# Patient Record
Sex: FEMALE | Race: WHITE | Employment: OTHER | URBAN - METROPOLITAN AREA
[De-identification: names, ages, dates, MRNs, and addresses within clinical notes are randomized per-mention and may not be internally consistent; named-entity substitution may affect disease eponyms.]

---

## 2017-03-10 ENCOUNTER — OFFICE VISIT (OUTPATIENT)
Dept: SURGERY | Age: 52
End: 2017-03-10

## 2017-03-10 VITALS
HEART RATE: 70 BPM | OXYGEN SATURATION: 100 % | WEIGHT: 132 LBS | SYSTOLIC BLOOD PRESSURE: 94 MMHG | TEMPERATURE: 97.8 F | HEIGHT: 64 IN | DIASTOLIC BLOOD PRESSURE: 75 MMHG | BODY MASS INDEX: 22.53 KG/M2

## 2017-03-10 DIAGNOSIS — Z90.722 H/O TOTAL HYSTERECTOMY WITH REMOVAL OF BOTH TUBES AND OVARIES: ICD-10-CM

## 2017-03-10 DIAGNOSIS — M85.80 OSTEOPENIA: ICD-10-CM

## 2017-03-10 DIAGNOSIS — Z90.79 H/O TOTAL HYSTERECTOMY WITH REMOVAL OF BOTH TUBES AND OVARIES: ICD-10-CM

## 2017-03-10 DIAGNOSIS — Z01.419 ENCOUNTER FOR GYNECOLOGICAL EXAMINATION WITHOUT ABNORMAL FINDING: Primary | ICD-10-CM

## 2017-03-10 DIAGNOSIS — N95.1 SYMPTOMATIC MENOPAUSAL OR FEMALE CLIMACTERIC STATES: ICD-10-CM

## 2017-03-10 DIAGNOSIS — Z12.31 ENCOUNTER FOR SCREENING MAMMOGRAM FOR BREAST CANCER: ICD-10-CM

## 2017-03-10 DIAGNOSIS — Z90.710 H/O TOTAL HYSTERECTOMY WITH REMOVAL OF BOTH TUBES AND OVARIES: ICD-10-CM

## 2017-03-10 NOTE — MR AVS SNAPSHOT
Visit Information Date & Time Provider Department Dept. Phone Encounter #  
 3/10/2017  1:00 PM Dariela Woo, [address] [facility] [address] [id number] Follow-up Instructions Return in about 1 year (around 3/10/2018), or if symptoms worsen or fail to improve. Upcoming Health Maintenance Date Due Hepatitis C Screening 1965 DTaP/Tdap/Td series (1 - Tdap) 7/29/1984 FOBT Q 1 YEAR AGE 50-75 7/29/2013 INFLUENZA AGE 9 TO ADULT 8/1/2016 PAP AKA CERVICAL CYTOLOGY 12/19/2017 BREAST CANCER SCRN MAMMOGRAM 3/11/2018 Allergies as of 3/10/2017  Review Complete On: 3/10/2017 By: Dariela Woo MD  
  
 Severity Noted Reaction Type Reactions Compazine [Prochlorperazine Edisylate]  10/26/2011    Unable to Obtain Tedral  12/16/2011    Unable to Obtain Current Immunizations  Never Reviewed No immunizations on file. Not reviewed this visit You Were Diagnosed With   
  
 Codes Comments Encounter for gynecological examination without abnormal finding    -  Primary ICD-10-CM: G33.062 ICD-9-CM: V72.31 Symptomatic menopausal or female climacteric states     ICD-10-CM: N95.1 ICD-9-CM: 627.2 H/O total hysterectomy with removal of both tubes and ovaries     ICD-10-CM: Z90.710, Z90.79, I23.132 ICD-9-CM: V88.01, V45.77 Osteopenia     ICD-10-CM: M85.80 ICD-9-CM: 733.90 Encounter for screening mammogram for breast cancer     ICD-10-CM: Z12.31 
ICD-9-CM: V76.12 Vitals BP Pulse Temp Height(growth percentile) Weight(growth percentile) LMP  
 94/75 70 97.8 °F (36.6 °C) (Oral) 5' 4\" (1.626 m) 132 lb (59.9 kg) 09/25/2000 SpO2 BMI OB Status Smoking Status 100% 22.66 kg/m2 Hysterectomy Never Smoker Vitals History BMI and BSA Data Body Mass Index Body Surface Area  
 22.66 kg/m 2 1.64 m 2 Preferred Pharmacy Pharmacy Name Phone  [facility], [address] [facility] [phone] Your Updated Medication List  
  
   
This list is accurate as of: 3/10/17  1:43 PM.  Always use your most recent med list.  
  
  
  
  
 estrogens (conjugated) 0.45 mg tablet Commonly known as:  PREMARIN  
TAKE 1 TABLET DAILY  
  
 SINGULAIR 10 mg tablet Generic drug:  montelukast  
Take 10 mg by mouth daily. Prescriptions Sent to Pharmacy Refills  
 estrogens, conjugated, (PREMARIN) 0.45 mg tablet 3 Sig: TAKE 1 TABLET DAILY Class: Normal  
 Pharmacy: [address] [place] [address] 43, [address]  #: [phone] Follow-up Instructions Return in about 1 year (around 3/10/2018), or if symptoms worsen or fail to improve. To-Do List   
 03/10/2017 Imaging:  DEXA BONE DENSITY STUDY AXIAL   
  
 03/10/2017 Imaging:  MARISA MAMMO BI SCREENING INCL CAD Introducing [facility]! Indiana Yadav introduces Asurint patient portal. Now you can access parts of your medical record, email your doctor's office, and request medication refills online. 1. In your internet browser, go to [email or web address] 2. Click on the First Time User? Click Here link in the Sign In box. You will see the New Member Sign Up page. 3. Enter your Asurint Access Code exactly as it appears below. You will not need to use this code after youve completed the sign-up process. If you do not sign up before the expiration date, you must request a new code. · Asurint Access Code: [email or web address] Expires: 6/8/2017  1:42 PM 
 
4. Enter the last four digits of your Social Security Number (xxxx) and Date of Birth (mm/dd/yyyy) as indicated and click Submit. You will be taken to the next sign-up page. 5. Create a Bone Therapeuticst ID. This will be your Asurint login ID and cannot be changed, so think of one that is secure and easy to remember. 6. Create a Asurint password. You can change your password at any time. 7. Enter your Password Reset Question and Answer. This can be used at a later time if you forget your password. 8. Enter your e-mail address. You will receive e-mail notification when new information is available in [address] 9. Click Sign Up. You can now view and download portions of your medical record. 10. Click the Download Summary menu link to download a portable copy of your medical information. If you have questions, please visit the Frequently Asked Questions section of the StockCastr website. Remember, StockCastr is NOT to be used for urgent needs. For medical emergencies, dial 911. Now available from your iPhone and Android! Please provide this summary of care documentation to your next provider. Your primary care clinician is listed as Jahaira Guerrier. If you have any questions after today's visit, please call [phone].

## 2017-03-10 NOTE — PROGRESS NOTES
SUBJECTIVE: Loreto An is a 46 y.o. female who presents with desire for annual well woman exam. Patient's last menstrual period was 2000. Menopausal symptoms. Hx of right rotator cuff injury due to bony spur and osteoarthritis. Hx of osteopenia. Allergies   Allergen Reactions    Compazine [Prochlorperazine Edisylate] Unable to Obtain    Tedral Unable to Obtain       Past Medical History:   Diagnosis Date    Arthritis     osteoarthritis in right shoulder with spur    Asthma        Past Surgical History:   Procedure Laterality Date    BIOPSY OF BREAST, NEEDLE CORE  1/15/08    HX APPENDECTOMY      HX BUNIONECTOMY  3/2013    HX MOHS PROCEDURES Right     HX RICHMOND AND BSO  2004    RICHMOND BSO 2004 endometriosis       OB History      Para Term  AB TAB SAB Ectopic Multiple Living    0 0 0 0 0 0 0 0 0 0          Family History   Problem Relation Age of Onset    Hypertension Mother     Arthritis-osteo Mother     Hypertension Father     Heart Disease Father      Social History     Social History    Marital status: SINGLE     Spouse name: N/A    Number of children: N/A    Years of education: N/A     Occupational History    Not on file. Social History Main Topics    Smoking status: Never Smoker    Smokeless tobacco: Never Used    Alcohol use Yes      Comment: socially    Drug use: No    Sexual activity: Yes     Partners: Female     Other Topics Concern    Not on file     Social History Narrative       Current Outpatient Prescriptions   Medication Sig Dispense Refill    estrogens, conjugated, (PREMARIN) 0.45 mg tablet TAKE 1 TABLET DAILY 90 Tab 3    montelukast (SINGULAIR) 10 mg tablet Take 10 mg by mouth daily. Review of Systems:   Constitutional: No weight change, chills or fever, anorexia, weakness or sleep disturbance . Cardiovascular: No chest pain, shortness of breath, or palpitations .   Respiratory: No cough, shortness of breath, hemoptysis, or orthopnea . Neurologic: No syncope, headaches or seizures . Hematologic: No easy bruising or unusual bleeding . Psychiatric: No insomnia, confusion, depression, or anxiety . GI:No nausea and vomiting, diarrhea or constipation  . : See HPI . Musculoskeletal: No joint pain or muscle pain . Endocrine: No polydipsia, polyuria, cold intolerance, excessive fatigue, or sleep disturbance . Integumentary: No breast pain, lumps, nipple discharge, or axillary lumps . Objective:     Visit Vitals    BP 94/75    Pulse 70    Temp 97.8 °F (36.6 °C) (Oral)    Ht 5' 4\" (1.626 m)    Wt 132 lb (59.9 kg)    LMP 09/25/2000    SpO2 100%    BMI 22.66 kg/m2       General:  alert, cooperative, no distress, appears stated age   Skin:  no rash or abnormalities   Eyes: negative   Mouth: MMM no lesions   Lymph Nodes:  Cervical, supraclavicular, and axillary nodes normal.   Breast Exam: normal appearance, no masses or tenderness    Lungs:  clear to auscultation bilaterally   Heart:  regular rate and rhythm   Abdomen: soft, non-tender. Bowel sounds normal. No masses,  no organomegaly   Back:  Costovertebral angle tenderness absent   Genitourinary: Pelvic exam: VULVA: normal appearing vulva with no masses, tenderness or lesions, VAGINA: normal appearing vagina with normal color and discharge, no lesions, PELVIC FLOOR EXAM: no cystocele, rectocele or prolapse noted, CERVIX: surgically absent, UTERUS: absent, ADNEXA: surgically absent bilateral    Extremities:  extremities normal, atraumatic, no cyanosis or edema   Neurologic:  sensation grossly intact. Psychiatric:  non focal     ASSESSMENT:      ICD-10-CM ICD-9-CM    1. Encounter for gynecological examination without abnormal finding Z01.419 V72.31    2. Symptomatic menopausal or female climacteric states N95.1 627.2 estrogens, conjugated, (PREMARIN) 0.45 mg tablet   3.  H/O total hysterectomy with removal of both tubes and ovaries Z90.710 V88.01     Z90.79 V45.77 Z90.722     4. Osteopenia M85.80 733.90 DEXA BONE DENSITY STUDY AXIAL   5. Encounter for screening mammogram for breast cancer Z12.31 V76.12 MARISA MAMMO BI SCREENING INCL CAD        Follow-up Disposition:  Return in about 1 year (around 3/10/2018), or if symptoms worsen or fail to improve.

## 2017-04-17 ENCOUNTER — HOSPITAL ENCOUNTER (OUTPATIENT)
Dept: MAMMOGRAPHY | Age: 52
Discharge: HOME | End: 2017-04-17
Attending: OBSTETRICS & GYNECOLOGY
Payer: COMMERCIAL

## 2017-04-17 DIAGNOSIS — M85.80 OSTEOPENIA: ICD-10-CM

## 2017-04-17 DIAGNOSIS — Z12.31 ENCOUNTER FOR SCREENING MAMMOGRAM FOR BREAST CANCER: ICD-10-CM

## 2017-04-17 PROCEDURE — 77067 SCR MAMMO BI INCL CAD: CPT

## 2017-04-17 PROCEDURE — 77080 DXA BONE DENSITY AXIAL: CPT

## 2018-02-26 DIAGNOSIS — N95.1 SYMPTOMATIC MENOPAUSAL OR FEMALE CLIMACTERIC STATES: ICD-10-CM

## 2018-02-26 RX ORDER — ESTROGENS, CONJUGATED 0.45 MG/1
TABLET, FILM COATED ORAL
Qty: 30 TAB | Refills: 0 | Status: SHIPPED | OUTPATIENT
Start: 2018-02-26

## 2018-03-13 ENCOUNTER — HOSPITAL ENCOUNTER (OUTPATIENT)
Dept: LAB | Age: 53
Discharge: HOME | End: 2018-03-13
Payer: COMMERCIAL

## 2018-03-13 ENCOUNTER — OFFICE VISIT (OUTPATIENT)
Dept: SURGERY | Age: 53
End: 2018-03-13

## 2018-03-13 VITALS
HEIGHT: 64 IN | BODY MASS INDEX: 23.42 KG/M2 | TEMPERATURE: 97.9 F | WEIGHT: 137.2 LBS | OXYGEN SATURATION: 100 % | HEART RATE: 70 BPM | SYSTOLIC BLOOD PRESSURE: 107 MMHG | DIASTOLIC BLOOD PRESSURE: 72 MMHG

## 2018-03-13 DIAGNOSIS — N95.1 SYMPTOMATIC MENOPAUSAL OR FEMALE CLIMACTERIC STATES: ICD-10-CM

## 2018-03-13 DIAGNOSIS — Z90.79 H/O TOTAL HYSTERECTOMY WITH REMOVAL OF BOTH TUBES AND OVARIES: ICD-10-CM

## 2018-03-13 DIAGNOSIS — Z01.419 ENCOUNTER FOR ROUTINE GYNECOLOGICAL EXAMINATION WITH PAPANICOLAOU SMEAR OF CERVIX: Primary | ICD-10-CM

## 2018-03-13 DIAGNOSIS — Z90.710 H/O TOTAL HYSTERECTOMY WITH REMOVAL OF BOTH TUBES AND OVARIES: ICD-10-CM

## 2018-03-13 DIAGNOSIS — Z90.722 H/O TOTAL HYSTERECTOMY WITH REMOVAL OF BOTH TUBES AND OVARIES: ICD-10-CM

## 2018-03-13 PROCEDURE — 88142 CYTOPATH C/V THIN LAYER: CPT | Performed by: OBSTETRICS & GYNECOLOGY

## 2018-03-13 RX ORDER — METRONIDAZOLE 7.5 MG/G
LOTION TOPICAL
Refills: 2 | COMMUNITY
Start: 2017-12-17

## 2018-03-13 NOTE — MR AVS SNAPSHOT
[address] [id number] [phone] Patient: Vani Romano MRN: [id number] :1965 Visit Information Date & Time Provider Department Dept. Phone Encounter #  
 3/13/2018  1:00 PM Guerrero Weiner, [address] [place] [address] [facility] [phone] [id number] Follow-up Instructions Return in about 1 year (around 3/13/2019), or if symptoms worsen or fail to improve. Upcoming Health Maintenance Date Due Hepatitis C Screening 1965 DTaP/Tdap/Td series (1 - Tdap) 1984 FOBT Q 1 YEAR AGE 50-75 2013 Influenza Age 5 to Adult 2017 PAP AKA CERVICAL CYTOLOGY 2017 BREAST CANCER SCRN MAMMOGRAM 2019 Allergies as of 3/13/2018  Review Complete On: 3/13/2018 By: Guerrero Weiner MD  
  
 Severity Noted Reaction Type Reactions Compazine [Prochlorperazine Edisylate]  10/26/2011    Unable to Obtain Tedral  2011    Unable to Obtain Current Immunizations  Never Reviewed No immunizations on file. Not reviewed this visit You Were Diagnosed With   
  
 Codes Comments Encounter for routine gynecological examination with Papanicolaou smear of cervix    -  Primary ICD-10-CM: O73.983 ICD-9-CM: V72.31, V76.2 Symptomatic menopausal or female climacteric states     ICD-10-CM: N95.1 ICD-9-CM: 627.2 H/O total hysterectomy with removal of both tubes and ovaries     ICD-10-CM: Z90.710, Z90.79, R66.238 ICD-9-CM: V88.01, V45.77 Vitals BP Pulse Temp Height(growth percentile) Weight(growth percentile) LMP  
 107/72 70 97.9 °F (36.6 °C) (Temporal) 5' 4\" (1.626 m) 137 lb 3.2 oz (62.2 kg) 2000 SpO2 BMI OB Status Smoking Status 100% 23.55 kg/m2 Hysterectomy Never Smoker Vitals History BMI and BSA Data Body Mass Index Body Surface Area  
 23.55 kg/m 2 1.68 m 2 Preferred Pharmacy Pharmacy Name Phone [address] [place] [address] Carly Sharma [phone] Your Updated Medication List  
  
   
This list is accurate as of 3/13/18  2:02 PM.  Always use your most recent med list.  
  
  
  
  
 metroNIDAZOLE 0.75 % lotion Commonly known as:  METROLOTION  
APPLY TOPICAL TO FACE TWICE DAILY  
  
 * PREMARIN 0.45 mg tablet Generic drug:  estrogens (conjugated) TAKE 1 TABLET BY MOUTH DAILY * estrogens (conjugated) 0.45 mg tablet Commonly known as:  PREMARIN Take 1 Tab by mouth daily. * estrogens (conjugated) 0.45 mg tablet Commonly known as:  PREMARIN Take 1 Tab by mouth daily. SINGULAIR 10 mg tablet Generic drug:  montelukast  
Take 10 mg by mouth daily. * Notice: This list has 3 medication(s) that are the same as other medications prescribed for you. Read the directions carefully, and ask your doctor or other care provider to review them with you. Prescriptions Sent to Pharmacy Refills  
 estrogens, conjugated, (PREMARIN) 0.45 mg tablet 1 Sig: Take 1 Tab by mouth daily. Class: Normal  
 Pharmacy: [facility]/pharmacy #[id number] Ph #: [phone] Route: Oral  
 estrogens, conjugated, (PREMARIN) 0.45 mg tablet 4 Sig: Take 1 Tab by mouth daily. Class: Normal  
 Pharmacy: [address] [place], [place] [address] Ph #: [phone] Route: Oral  
  
We Performed the Following PAP, LB, RFX HPV UVDXH([id number]) [id number] CPT(R)] Follow-up Instructions Return in about 1 year (around 3/13/2019), or if symptoms worsen or fail to improve. To-Do List   
 03/13/2018 Imaging:  MARISA MAMMO BI SCREENING INCL CAD Introducing [facility]! Madai Gong introduces [employer] patient portal. Now you can access parts of your medical record, email your doctor's office, and request medication refills online. 1. In your internet browser, go to [email or web address] 2. Click on the First Time User? Click Here link in the Sign In box. You will see the New Member Sign Up page. 3. Enter your [employer] Access Code exactly as it appears below. You will not need to use this code after youve completed the sign-up process. If you do not sign up before the expiration date, you must request a new code. · [employer] Access Code: [email or web address] Expires: 6/11/2018  1:36 PM 
 
4. Enter the last four digits of your Social Security Number (xxxx) and Date of Birth (mm/dd/yyyy) as indicated and click Submit. You will be taken to the next sign-up page. 5. Create a Voxelt ID. This will be your [employer] login ID and cannot be changed, so think of one that is secure and easy to remember. 6. Create a [employer] password. You can change your password at any time. 7. Enter your Password Reset Question and Answer. This can be used at a later time if you forget your password. 8. Enter your e-mail address. You will receive e-mail notification when new information is available in [address] 9. Click Sign Up. You can now view and download portions of your medical record. 10. Click the Download Summary menu link to download a portable copy of your medical information. If you have questions, please visit the Frequently Asked Questions section of the [employer] website. Remember, [employer] is NOT to be used for urgent needs. For medical emergencies, dial 911. Now available from your iPhone and Android! Please provide this summary of care documentation to your next provider. Your primary care clinician is listed as Rohit Phillips. If you have any questions after today's visit, please call [phone].

## 2018-03-13 NOTE — PROGRESS NOTES
SUBJECTIVE: Martin Maxwell is a 45 y.o. female who presents with desire for annual well woman exam. Patient's last menstrual period was 2000. Allergies   Allergen Reactions    Compazine [Prochlorperazine Edisylate] Unable to Obtain    Tedral Unable to Obtain       Past Medical History:   Diagnosis Date    Arthritis     osteoarthritis in right shoulder with spur    Asthma        Past Surgical History:   Procedure Laterality Date    HX APPENDECTOMY      HX BREAST BIOPSY Right     years ago- no visible scar    HX BUNIONECTOMY  3/2013    HX MOHS PROCEDURES Right     HX RICHMOND AND BSO  2004    RICHMOND BSO 2004 endometriosis    NM BIOPSY OF BREAST, NEEDLE CORE  1/15/08       OB History      Para Term  AB Living    0 0 0 0 0 0    SAB TAB Ectopic Molar Multiple Live Births    0 0 0  0           Family History   Problem Relation Age of Onset    Hypertension Mother     Arthritis-osteo Mother     Hypertension Father     Heart Disease Father      Social History     Social History    Marital status:      Spouse name: N/A    Number of children: N/A    Years of education: N/A     Occupational History    Not on file. Social History Main Topics    Smoking status: Never Smoker    Smokeless tobacco: Never Used    Alcohol use Yes      Comment: socially    Drug use: No    Sexual activity: Yes     Partners: Female     Other Topics Concern    Not on file     Social History Narrative       Current Outpatient Prescriptions   Medication Sig Dispense Refill    metroNIDAZOLE (METROLOTION) 0.75 % lotion APPLY TOPICAL TO FACE TWICE DAILY  2    estrogens, conjugated, (PREMARIN) 0.45 mg tablet Take 1 Tab by mouth daily. 30 Tab 1    estrogens, conjugated, (PREMARIN) 0.45 mg tablet Take 1 Tab by mouth daily. 90 Tab 4    PREMARIN 0.45 mg tablet TAKE 1 TABLET BY MOUTH DAILY 30 Tab 0    montelukast (SINGULAIR) 10 mg tablet Take 10 mg by mouth daily.            Review of Systems:   Constitutional: No weight change, chills or fever, anorexia, weakness or sleep disturbance . Cardiovascular: No chest pain, shortness of breath, or palpitations . Respiratory: No cough, shortness of breath, hemoptysis, or orthopnea . Neurologic: No syncope, headaches or seizures . Hematologic: No easy bruising or unusual bleeding . Psychiatric: No insomnia, confusion, depression, or anxiety . GI:No nausea and vomiting, diarrhea or constipation  . : See HPI . Musculoskeletal: No joint pain or muscle pain . Endocrine: No polydipsia, polyuria, cold intolerance, excessive fatigue, or sleep disturbance . Integumentary: No breast pain, lumps, nipple discharge, or axillary lumps . Objective:     Visit Vitals    /72    Pulse 70    Temp 97.9 °F (36.6 °C) (Temporal)    Ht 5' 4\" (1.626 m)    Wt 137 lb 3.2 oz (62.2 kg)    LMP 09/25/2000    SpO2 100%    BMI 23.55 kg/m2       General:  alert, cooperative, no distress, appears stated age   Skin:  no rash or abnormalities   Eyes: negative   Mouth: MMM no lesions   Lymph Nodes:  Cervical, supraclavicular, and axillary nodes normal.   Breast Exam: normal appearance, no masses or tenderness    Lungs:  clear to auscultation bilaterally   Heart:  regular rate and rhythm   Abdomen: soft, non-tender. Bowel sounds normal. No masses,  no organomegaly   Back:  Costovertebral angle tenderness absent   Genitourinary: Pelvic exam: VULVA: normal appearing vulva with no masses, tenderness or lesions, VAGINA: normal appearing vagina with normal color and discharge, no lesions, PELVIC FLOOR EXAM: no cystocele, rectocele or prolapse noted, CERVIX: surgically absent, UTERUS: absent, ADNEXA: surgically absent bilateral    Extremities:  extremities normal, atraumatic, no cyanosis or edema   Neurologic:  sensation grossly intact. Psychiatric:  non focal     ASSESSMENT:      ICD-10-CM ICD-9-CM    1.  Encounter for routine gynecological examination with Papanicolaou smear of cervix Z01.419 V72.31 MARISA MAMMO BI SCREENING INCL CAD     V76.2 PAP, LB, RFX HPV TACFD([id number])   2. Symptomatic menopausal or female climacteric states N95.1 627.2    3. H/O total hysterectomy with removal of both tubes and ovaries Z90.710 V88.01     Z90.79 V45.77     Z90.722          Follow-up Disposition:  Return in about 1 year (around 3/13/2019), or if symptoms worsen or fail to improve.

## 2018-06-04 ENCOUNTER — HOSPITAL ENCOUNTER (OUTPATIENT)
Dept: MAMMOGRAPHY | Age: 53
Discharge: HOME | End: 2018-06-04
Attending: OBSTETRICS & GYNECOLOGY
Payer: COMMERCIAL

## 2018-06-04 DIAGNOSIS — Z01.419 ENCOUNTER FOR ROUTINE GYNECOLOGICAL EXAMINATION WITH PAPANICOLAOU SMEAR OF CERVIX: ICD-10-CM

## 2018-06-04 PROCEDURE — 77067 SCR MAMMO BI INCL CAD: CPT

## 2021-08-06 ENCOUNTER — TRANSCRIBE ORDER (OUTPATIENT)
Dept: SCHEDULING | Age: 56
End: 2021-08-06

## 2021-08-06 DIAGNOSIS — Z13.820 SCREENING FOR OSTEOPOROSIS: Primary | ICD-10-CM

## 2021-08-06 DIAGNOSIS — M85.859 OTHER SPECIFIED DISORDERS OF BONE DENSITY AND STRUCTURE, UNSPECIFIED THIGH: ICD-10-CM

## 2021-08-18 ENCOUNTER — HOSPITAL ENCOUNTER (OUTPATIENT)
Dept: MAMMOGRAPHY | Age: 56
Discharge: HOME | End: 2021-08-18
Attending: SPECIALIST
Payer: COMMERCIAL

## 2021-08-18 DIAGNOSIS — M85.859 OTHER SPECIFIED DISORDERS OF BONE DENSITY AND STRUCTURE, UNSPECIFIED THIGH: ICD-10-CM

## 2021-08-18 DIAGNOSIS — Z13.820 SCREENING FOR OSTEOPOROSIS: ICD-10-CM

## 2021-08-18 PROCEDURE — 77080 DXA BONE DENSITY AXIAL: CPT

## 2023-05-12 RX ORDER — MONTELUKAST SODIUM 10 MG/1
10 TABLET ORAL DAILY
COMMUNITY

## 2023-05-12 RX ORDER — METRONIDAZOLE 7.5 MG/G
LOTION TOPICAL
COMMUNITY
Start: 2017-12-17